# Patient Record
Sex: FEMALE | Race: WHITE | NOT HISPANIC OR LATINO | Employment: FULL TIME | URBAN - METROPOLITAN AREA
[De-identification: names, ages, dates, MRNs, and addresses within clinical notes are randomized per-mention and may not be internally consistent; named-entity substitution may affect disease eponyms.]

---

## 2020-07-17 ENCOUNTER — HOSPITAL ENCOUNTER (OUTPATIENT)
Facility: MEDICAL CENTER | Age: 55
End: 2020-07-17

## 2022-09-26 ENCOUNTER — HOSPITAL ENCOUNTER (OUTPATIENT)
Dept: RADIOLOGY | Facility: MEDICAL CENTER | Age: 57
End: 2022-09-26
Attending: INTERNAL MEDICINE
Payer: COMMERCIAL

## 2022-09-26 ENCOUNTER — HOSPITAL ENCOUNTER (OUTPATIENT)
Dept: LAB | Facility: MEDICAL CENTER | Age: 57
End: 2022-09-26
Attending: INTERNAL MEDICINE
Payer: COMMERCIAL

## 2022-09-26 ENCOUNTER — OFFICE VISIT (OUTPATIENT)
Dept: RHEUMATOLOGY | Facility: MEDICAL CENTER | Age: 57
End: 2022-09-26
Attending: INTERNAL MEDICINE
Payer: COMMERCIAL

## 2022-09-26 VITALS
BODY MASS INDEX: 30.48 KG/M2 | HEIGHT: 63 IN | TEMPERATURE: 97.6 F | WEIGHT: 172 LBS | RESPIRATION RATE: 14 BRPM | HEART RATE: 78 BPM | DIASTOLIC BLOOD PRESSURE: 70 MMHG | OXYGEN SATURATION: 99 % | SYSTOLIC BLOOD PRESSURE: 130 MMHG

## 2022-09-26 DIAGNOSIS — K57.92 DIVERTICULITIS: ICD-10-CM

## 2022-09-26 DIAGNOSIS — Z79.620 INFLIXIMAB (REMICADE) LONG-TERM USE: ICD-10-CM

## 2022-09-26 DIAGNOSIS — E03.9 HYPOTHYROIDISM, UNSPECIFIED TYPE: ICD-10-CM

## 2022-09-26 DIAGNOSIS — K50.919 CROHN'S DISEASE WITH COMPLICATION, UNSPECIFIED GASTROINTESTINAL TRACT LOCATION (HCC): ICD-10-CM

## 2022-09-26 DIAGNOSIS — M05.79 RHEUMATOID ARTHRITIS INVOLVING MULTIPLE SITES WITH POSITIVE RHEUMATOID FACTOR (HCC): ICD-10-CM

## 2022-09-26 LAB
HBV CORE IGM SER QL: NORMAL
HBV SURFACE AG SER QL: NORMAL
HCV AB SER QL: NORMAL

## 2022-09-26 PROCEDURE — 86200 CCP ANTIBODY: CPT

## 2022-09-26 PROCEDURE — 86803 HEPATITIS C AB TEST: CPT

## 2022-09-26 PROCEDURE — 86480 TB TEST CELL IMMUN MEASURE: CPT

## 2022-09-26 PROCEDURE — 36415 COLL VENOUS BLD VENIPUNCTURE: CPT

## 2022-09-26 PROCEDURE — 77077 JOINT SURVEY SINGLE VIEW: CPT

## 2022-09-26 PROCEDURE — 99212 OFFICE O/P EST SF 10 MIN: CPT | Performed by: INTERNAL MEDICINE

## 2022-09-26 PROCEDURE — 87340 HEPATITIS B SURFACE AG IA: CPT

## 2022-09-26 PROCEDURE — 86705 HEP B CORE ANTIBODY IGM: CPT

## 2022-09-26 PROCEDURE — 99205 OFFICE O/P NEW HI 60 MIN: CPT | Performed by: INTERNAL MEDICINE

## 2022-09-26 RX ORDER — OMEPRAZOLE 40 MG/1
40 CAPSULE, DELAYED RELEASE ORAL DAILY
COMMUNITY

## 2022-09-26 RX ORDER — MERCAPTOPURINE 50 MG/1
50 TABLET ORAL DAILY
COMMUNITY

## 2022-09-26 RX ORDER — LOSARTAN POTASSIUM AND HYDROCHLOROTHIAZIDE 12.5; 1 MG/1; MG/1
1 TABLET ORAL DAILY
COMMUNITY

## 2022-09-26 RX ORDER — LEVOTHYROXINE SODIUM 0.05 MG/1
50 TABLET ORAL
COMMUNITY

## 2022-09-26 RX ORDER — METOPROLOL SUCCINATE 25 MG/1
25 TABLET, EXTENDED RELEASE ORAL DAILY
COMMUNITY

## 2022-09-26 RX ORDER — MESALAMINE 400 MG/1
400 CAPSULE, DELAYED RELEASE ORAL 2 TIMES DAILY
COMMUNITY

## 2022-09-26 RX ORDER — METAXALONE 800 MG/1
800 TABLET ORAL 3 TIMES DAILY
COMMUNITY

## 2022-09-26 RX ORDER — ALPRAZOLAM 0.5 MG/1
0.5 TABLET ORAL 2 TIMES DAILY PRN
COMMUNITY

## 2022-09-26 RX ORDER — ERGOCALCIFEROL 1.25 MG/1
CAPSULE ORAL
COMMUNITY

## 2022-09-26 RX ORDER — FLUTICASONE PROPIONATE 50 MCG
1 SPRAY, SUSPENSION (ML) NASAL DAILY
COMMUNITY

## 2022-09-26 ASSESSMENT — JOINT PAIN
TOTAL NUMBER OF SWOLLEN JOINTS: 0
TOTAL NUMBER OF TENDER JOINTS: 6
TOTAL NUMBER OF TENDER JOINTS: 4

## 2022-09-26 ASSESSMENT — PAIN SCALES - GENERAL: PAINLEVEL: 8=MODERATE-SEVERE PAIN
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
## 2022-09-27 LAB
GAMMA INTERFERON BACKGROUND BLD IA-ACNC: 0.06 IU/ML
M TB IFN-G BLD-IMP: NEGATIVE
M TB IFN-G CD4+ BCKGRND COR BLD-ACNC: 0 IU/ML
MITOGEN IGNF BCKGRD COR BLD-ACNC: >10 IU/ML
QFT TB2 - NIL TBQ2: -0.01 IU/ML

## 2022-09-28 LAB — CCP IGG SERPL-ACNC: 3 UNITS (ref 0–19)

## 2022-10-10 ENCOUNTER — OFFICE VISIT (OUTPATIENT)
Dept: RHEUMATOLOGY | Facility: MEDICAL CENTER | Age: 57
End: 2022-10-10
Attending: INTERNAL MEDICINE
Payer: COMMERCIAL

## 2022-10-10 VITALS
SYSTOLIC BLOOD PRESSURE: 122 MMHG | HEART RATE: 76 BPM | RESPIRATION RATE: 14 BRPM | OXYGEN SATURATION: 98 % | BODY MASS INDEX: 30.65 KG/M2 | WEIGHT: 173 LBS | DIASTOLIC BLOOD PRESSURE: 60 MMHG | TEMPERATURE: 96.9 F

## 2022-10-10 DIAGNOSIS — Z79.620 INFLIXIMAB (REMICADE) LONG-TERM USE: ICD-10-CM

## 2022-10-10 DIAGNOSIS — E03.9 HYPOTHYROIDISM, UNSPECIFIED TYPE: ICD-10-CM

## 2022-10-10 DIAGNOSIS — K57.92 DIVERTICULITIS: ICD-10-CM

## 2022-10-10 DIAGNOSIS — K50.919 CROHN'S DISEASE WITH COMPLICATION, UNSPECIFIED GASTROINTESTINAL TRACT LOCATION (HCC): ICD-10-CM

## 2022-10-10 DIAGNOSIS — G47.30 SLEEP APNEA, UNSPECIFIED TYPE: ICD-10-CM

## 2022-10-10 DIAGNOSIS — M05.79 RHEUMATOID ARTHRITIS INVOLVING MULTIPLE SITES WITH POSITIVE RHEUMATOID FACTOR (HCC): ICD-10-CM

## 2022-10-10 DIAGNOSIS — F41.9 ANXIETY: ICD-10-CM

## 2022-10-10 PROCEDURE — 99212 OFFICE O/P EST SF 10 MIN: CPT | Performed by: INTERNAL MEDICINE

## 2022-10-10 PROCEDURE — 99215 OFFICE O/P EST HI 40 MIN: CPT | Performed by: INTERNAL MEDICINE

## 2022-10-10 RX ORDER — ACYCLOVIR 400 MG/1
TABLET ORAL
COMMUNITY
Start: 2022-09-07

## 2022-10-10 ASSESSMENT — JOINT PAIN
TOTAL NUMBER OF TENDER JOINTS: 2
TOTAL NUMBER OF TENDER JOINTS: 0
TOTAL NUMBER OF SWOLLEN JOINTS: 0
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
## 2022-10-11 ENCOUNTER — TELEPHONE (OUTPATIENT)
Dept: RHEUMATOLOGY | Facility: MEDICAL CENTER | Age: 57
End: 2022-10-11
Payer: COMMERCIAL

## 2022-10-11 RX ORDER — EPINEPHRINE 1 MG/ML(1)
0.5 AMPUL (ML) INJECTION PRN
Status: CANCELLED | OUTPATIENT
Start: 2022-10-11

## 2022-10-11 RX ORDER — DIPHENHYDRAMINE HYDROCHLORIDE 50 MG/ML
50 INJECTION INTRAMUSCULAR; INTRAVENOUS PRN
Status: CANCELLED | OUTPATIENT
Start: 2022-10-11

## 2022-10-11 RX ORDER — 0.9 % SODIUM CHLORIDE 0.9 %
VIAL (ML) INJECTION PRN
Status: CANCELLED | OUTPATIENT
Start: 2022-10-11

## 2022-10-11 RX ORDER — SODIUM CHLORIDE 9 MG/ML
INJECTION, SOLUTION INTRAVENOUS CONTINUOUS
Status: CANCELLED | OUTPATIENT
Start: 2022-10-11

## 2022-10-11 RX ORDER — 0.9 % SODIUM CHLORIDE 0.9 %
3 VIAL (ML) INJECTION PRN
Status: CANCELLED | OUTPATIENT
Start: 2022-10-11

## 2022-10-11 RX ORDER — HEPARIN SODIUM (PORCINE) LOCK FLUSH IV SOLN 100 UNIT/ML 100 UNIT/ML
500 SOLUTION INTRAVENOUS PRN
Status: CANCELLED | OUTPATIENT
Start: 2022-10-11

## 2022-10-11 RX ORDER — METHYLPREDNISOLONE SODIUM SUCCINATE 125 MG/2ML
125 INJECTION, POWDER, LYOPHILIZED, FOR SOLUTION INTRAMUSCULAR; INTRAVENOUS PRN
Status: CANCELLED | OUTPATIENT
Start: 2022-10-11

## 2022-10-11 RX ORDER — ACETAMINOPHEN 325 MG/1
650 TABLET ORAL ONCE
Status: CANCELLED | OUTPATIENT
Start: 2022-10-11

## 2022-10-11 RX ORDER — 0.9 % SODIUM CHLORIDE 0.9 %
10 VIAL (ML) INJECTION PRN
Status: CANCELLED | OUTPATIENT
Start: 2022-10-11

## 2022-10-11 NOTE — TELEPHONE ENCOUNTER
remicade infusion 7.5 mg/kg loading dose at weeks 0, 2 and 6 and then every 8 weeks thereafter [facility]

## 2022-10-11 NOTE — TELEPHONE ENCOUNTER
Rafia from [facility] in [place] called stating they have lost their prior auth person for the out pt infusion center so they will no longer be doing any prior auths for our infusion pts with private ins. They will continue to take medicare/medicaid ect. Just no private ins that has to have prior auths.    So you will need to re order this pts infusions here at [facility]/ [facility].       Thank you

## 2022-10-11 NOTE — TELEPHONE ENCOUNTER
I called and spoke with Zahra and relayed the message about having to come to [place] for the infusions. She is asking that you change the status on her [facility] paperwork as it wont just be a 2 hour infusion but an all day affair with the drive and infusion.     Please Advise.  I can print out the [facility] paperwork for you if need be.   Thank you

## 2022-10-19 DIAGNOSIS — M05.79 RHEUMATOID ARTHRITIS INVOLVING MULTIPLE SITES WITH POSITIVE RHEUMATOID FACTOR (HCC): ICD-10-CM

## 2022-10-19 DIAGNOSIS — Z79.631 METHOTREXATE, LONG TERM, CURRENT USE: ICD-10-CM

## 2022-10-19 DIAGNOSIS — Z79.899 LONG-TERM USE OF PLAQUENIL: ICD-10-CM

## 2022-10-19 RX ORDER — FOLIC ACID 1 MG/1
TABLET ORAL
Qty: 90 TABLET | Refills: 3 | Status: SHIPPED
Start: 2022-10-19

## 2022-10-19 RX ORDER — METHOTREXATE 2.5 MG/1
TABLET ORAL
Qty: 72 TABLET | Refills: 0 | Status: SHIPPED
Start: 2022-10-19

## 2022-10-19 RX ORDER — HYDROXYCHLOROQUINE SULFATE 200 MG/1
TABLET, FILM COATED ORAL
Qty: 180 TABLET | Refills: 1 | Status: SHIPPED
Start: 2022-10-19

## 2022-10-24 ENCOUNTER — TELEPHONE (OUTPATIENT)
Dept: RHEUMATOLOGY | Facility: MEDICAL CENTER | Age: 57
End: 2022-10-24
Payer: COMMERCIAL

## 2022-10-25 NOTE — TELEPHONE ENCOUNTER
Patient having difficulty determining a good treatment option for her arthritis, please schedule a follow-up appointment so we can discuss further treatment options for patient.

## 2022-10-31 ENCOUNTER — TELEPHONE (OUTPATIENT)
Dept: RHEUMATOLOGY | Facility: MEDICAL CENTER | Age: 57
End: 2022-10-31
Payer: COMMERCIAL